# Patient Record
Sex: MALE | Race: WHITE
[De-identification: names, ages, dates, MRNs, and addresses within clinical notes are randomized per-mention and may not be internally consistent; named-entity substitution may affect disease eponyms.]

---

## 2019-09-27 NOTE — US
EXAMINATION TYPE: US abdomen limited

 

DATE OF EXAM: 9/27/2019

 

COMPARISON: NONE

 

CLINICAL HISTORY: R76.8 Other finding in Serum. Pt states testing +for Hep B, elevated LFT's, no othe
r complaints at this time

 

EXAM MEASUREMENTS:

 

Liver Length:  17.3 cm   

Gallbladder Wall:  0.2 cm   

CBD:  0.4 cm

Right Kidney:  10.9 x 4.3 x 4.5 cm

 

 

 

Pancreas:  wnl

Liver:  wnl  

Gallbladder:  wnl

**Evidence for sonographic Husain's sign:  No

CBD:  wnl 

Right Kidney:  wnl 

 

 

 

IMPRESSION: 

1. Hepatomegaly.

## 2020-02-15 NOTE — CT
EXAMINATION TYPE: CT soft tissue neck w con

 

DATE OF EXAM: 2/14/2020

 

COMPARISON: None

 

HISTORY: Right submandibular mass. BB placed on region of interest.

 

CT DLP: 494.2 mGycm

 

CONTRAST: 

CT scan of the neck is performed with IV Contrast, patient injected with 100ml mL of Isovue 300.

 

Contrast enhanced CT of the neck was performed from the skull base through the lung apices. Marker wa
s placed at the site of clinical concern which corresponds to the right submandibular region.

 

AIRWAY:   The supraglottic, glottic, and subglottic portions of the airway appear patent and free of 
mass.

 

SALIVARY GLANDS:  The submandibular and parotid glands are free of mass or inflammatory process.

 

THYROID GLAND:  No nodules or masses seen.

 

LYMPH NODES: At The site of clinical concern there is a 9.8 mm lymph node identified. There are small
er sub-8 mm lymph nodes identified within the internal jugular chains bilaterally as well as the post
erior triangles. No adenopathy greater than 1 cm appreciated. 

 

LUNG APICES:  No nodule or mass is seen. Upper lobe emphysematous changes noted.

 

OTHER:  Vascular structures are patent.  No significant degenerative change of the cervical spine.  N
o abscess seen.

 

IMPRESSION:

At The site of clinical concern there is a 9.8 mm lymph node identified. There are smaller sub-8 mm l
ymph nodes identified within the internal jugular chains bilaterally as well as the posterior triangl
es. No adenopathy greater than 1 cm appreciated.

## 2021-10-21 ENCOUNTER — HOSPITAL ENCOUNTER (OUTPATIENT)
Dept: HOSPITAL 47 - 3SCARD | Age: 46
Setting detail: OBSERVATION
LOS: 1 days | Discharge: HOME | End: 2021-10-22
Attending: FAMILY MEDICINE | Admitting: FAMILY MEDICINE
Payer: COMMERCIAL

## 2021-10-21 DIAGNOSIS — E66.9: ICD-10-CM

## 2021-10-21 DIAGNOSIS — Z82.49: ICD-10-CM

## 2021-10-21 DIAGNOSIS — Z79.82: ICD-10-CM

## 2021-10-21 DIAGNOSIS — I25.2: ICD-10-CM

## 2021-10-21 DIAGNOSIS — I10: ICD-10-CM

## 2021-10-21 DIAGNOSIS — Z79.02: ICD-10-CM

## 2021-10-21 DIAGNOSIS — E78.5: ICD-10-CM

## 2021-10-21 DIAGNOSIS — Z79.899: ICD-10-CM

## 2021-10-21 DIAGNOSIS — Z87.891: ICD-10-CM

## 2021-10-21 DIAGNOSIS — Z95.5: ICD-10-CM

## 2021-10-21 DIAGNOSIS — I25.110: Primary | ICD-10-CM

## 2021-10-21 PROCEDURE — 80061 LIPID PANEL: CPT

## 2021-10-21 PROCEDURE — 80048 BASIC METABOLIC PNL TOTAL CA: CPT

## 2021-10-21 NOTE — PTCA
PERCUTANEOUSTRANS CORORONARY ANGIOGRAPHY



Mr. Le is a 45-year-old male with a strong family history of coronary 
artery

disease and prior history of smoking, which he stopped 2 years ago, who 
presented with

symptoms of chest discomfort to [facility] and was found to have

evidence consistent with unstable angina.  He underwent cardiac catheterization 
that

revealed a critical stenosis involving the mid LAD.  He was transferred to 
[facility] to undergo further evaluation.  The procedure, its risks and

complications were discussed with the patient, who was in full understanding and

agreement.



PROCEDURE DESCRIPTION:

Patient was brought to the cath lab in a fasting, semi-sedated state after 
receiving

Versed and Benadryl and achieving a moderate conscious sedated state.  Using 
guidewire

exchange technique, the 6[place] sheath in the right radial artery was exchanged
for a

new 6-[place] sheath.  Following that, a 6-[place] FL3.5 bend guiding catheter was

introduced in the system. After cannulating the left main, a 0.014 balanced 
medium

weight J-wire was advanced across the lesion, positioned distally, and then a 
2.5 x 12

mm NC Trek balloon was advanced and one inflation at 8 atmospheres was done. 
Following

that, the balloon was removed and a 3.0 x 18 mm Xience Skypoint stent was 
advanced,

deployed and post-dilated at 16 atmospheres.  After the last inflation, after

appropriate wait, the balloon and the guidewire were withdrawn back into the 
guiding

catheter.  Images were obtained and repeated. Those images revealed stable 
successful

stenting.  At that point, the guiding catheter, the balloon and the guidewire 
were

removed.  The sheath was removed. Hemostasis was obtained with deployment of a 
TR band.

There was no immediate complication. The patient was returned to his room in 
stable

condition.  Of note, the patient received 5500 units of intravenous heparin. His
ACT

was followed.  He has received an oral loading dose of clopidogrel.  He had no 
chest

discomfort with the inflation, with mild EKG changes that resolved at the end of
the

procedure.



RESULT:

Successful stenting of the mid LAD with reduction of stenosis from 99% to 0%.



RECOMMENDATIONS:

Patient will be continued on aspirin, Plavix and statin. The importance of  dual

antiplatelet treatment was discussed with the patient and his family, who are in
full

understanding and agreement.



Duration of sedation was 23 minutes.





MMODL / IJN: [id number] / Job#: [id number]

[facility]

## 2021-10-22 VITALS — HEART RATE: 66 BPM | SYSTOLIC BLOOD PRESSURE: 111 MMHG | DIASTOLIC BLOOD PRESSURE: 69 MMHG | TEMPERATURE: 98.1 F

## 2021-10-22 VITALS — RESPIRATION RATE: 16 BRPM

## 2021-10-22 LAB
ANION GAP SERPL CALC-SCNC: 7 MMOL/L
BUN SERPL-SCNC: 18 MG/DL (ref 9–20)
CALCIUM SPEC-MCNC: 9.6 MG/DL (ref 8.4–10.2)
CHLORIDE SERPL-SCNC: 104 MMOL/L (ref 98–107)
CHOLEST SERPL-MCNC: 211 MG/DL (ref 0–200)
CO2 SERPL-SCNC: 27 MMOL/L (ref 22–30)
GLUCOSE SERPL-MCNC: 98 MG/DL (ref 74–99)
HDLC SERPL-MCNC: 43.7 MG/DL (ref 40–60)
LDLC SERPL CALC-MCNC: 141.5 MG/DL (ref 0–131)
POTASSIUM SERPL-SCNC: 4.4 MMOL/L (ref 3.5–5.1)
SODIUM SERPL-SCNC: 138 MMOL/L (ref 137–145)
TRIGL SERPL-MCNC: 129 MG/DL (ref 0–149)
VLDLC SERPL CALC-MCNC: 25.8 MG/DL (ref 5–40)

## 2021-10-22 NOTE — P.DS
Providers


Date of admission: 


10/21/21 11:17





Expected date of discharge: 10/22/21


Attending physician: 


Tamir Garcia





Consults: 





                                        





10/21/21 12:56


Consult Physician Routine 


   Consulting Provider: [facility]


   Consult Reason/Comments: Post Interventional patient


   Do you want consulting provider notified?: Already Contacted











Primary care physician: 


Stated None





Hospital Course: 





Chief Complaint: Chest pain





This is a pleasant 45-year-old patient who presented initially to [facility].  He developed pain across the chest when he woke up and it 

progressed in the daytime.  Pain also been on the left arm.  There is no 

dizziness or lightheadedness aspiration.  Patient had been feeling about weak 

for about a week.  Patient is admitted at the [facility] with unstable 

angina.  Troponin was negative.  Cardiac Showed and 90-95% lesion of the mid 

LAD.  Patient was transferred here.  Successful angioplasty stenting was carried

out.


This morning no chest pain or shortness breath.  Cleared by currently for 

discharge.





Consultation:


Dr. Rodarte from cardiology








Past medical history to include:


Unremarkable





Social history:


.  Supervisor at [employer].  Smoked a pack a day for 28 years stopped 2 months 

ago.  Alcohol occasionally.  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





Family history:


CAD





Physical examination:


VITAL SIGNS: 98.4, 65, 16, 116 x 74, 96% room air


GENERAL: BMI 31, sitting up, comfortable.


EYES: Pupils equal.  Conjunctiva normal.


HEENT: External appearance of nose and ears normal, oral cavity grossly normal.


NECK: JVD not raised; masses not palpable.


HEART: First and second heart sounds are normal;  no edema.  


LUNGS: Respiratory rate normal; clear to auscultation.  


ABDOMEN: Soft,  nontender, liver spleen not palpable, no masses palpable.  


PSYCH: Alert and oriented x3;  mood  and affect normal.  


NEUROLOGICAL: Cranial nerves grossly intact; no facial asymmetry,   power and 

sensation grossly intact. 


LYMPHATICS: No lymph nodes palpable in the axilla and neck





INVESTIGATIONS, reviewed in the clinical context:


From [facility]:


EKG tracing personally reviewed by me: Sinus rhythm.


White count 4.8 hemoglobin 15.7 platelets 189 creatinine 0.9 potassium 4.1 LDL 

159





Assessment and plan:





-Unstable angina





-Coronary artery disease with 90-95% lesion LAD


Successful angioplasty and stenting





-Obesity BMI 31


Weight loss measures





-Hyperlipidemia


Lipitor








Disposition:


Home








Plan - Discharge Summary


Discharge Rx Participant: Yes


New Discharge Prescriptions: 


New


   Aspirin 81 mg PO DAILY 30 Days #30 tab


   Clopidogrel [Plavix] 75 mg PO DAILY 30 Days #30 tab


   Atorvastatin [Lipitor] 80 mg PO HS 30 Days #30 tab


   Nitroglycerin Sl Tabs [Nitrostat] 0.4 mg SUBLINGUAL Q5M PRN #25 tab


     PRN Reason: Chest Pain





Changed


   Omeprazole Magnesium [PriLOSEC OTC] 20 mg PO HS PRN #30 tab


     PRN Reason: Heartburn


Discharge Medication List





Aspirin 81 mg PO DAILY 30 Days #30 tab 10/22/21 [Rx]


Atorvastatin [Lipitor] 80 mg PO HS 30 Days #30 tab 10/22/21 [Rx]


Clopidogrel [Plavix] 75 mg PO DAILY 30 Days #30 tab 10/22/21 [Rx]


Nitroglycerin Sl Tabs [Nitrostat] 0.4 mg SUBLINGUAL Q5M PRN #25 tab 10/22/21 

[Rx]


Omeprazole Magnesium [PriLOSEC OTC] 20 mg PO HS PRN #30 tab 10/22/21 [Rx]








Follow up Appointment(s)/Referral(s): 


Andrews Nugent MD [REFERRING] - 1 Week


Kaleb Oquendo MD [STAFF PHYSICIAN] - 10/28/21 4:00 pm


Patient Instructions/Handouts:  Left Heart Catheterization (DC)


Discharge Disposition: HOME SELF-CARE

## 2021-10-22 NOTE — P.HPIM
History of Present Illness


H&P Date: 10/22/21


Chief Complaint: Chest pain





This is a pleasant 45-year-old patient who presented initially to [facility].  He developed pain across the chest when he woke up and it 

progressed in the daytime.  Pain also been on the left arm.  There is no 

dizziness or lightheadedness aspiration.  Patient had been feeling about weak 

for about a week.  Patient is admitted at the [facility] with unstable 

angina.  Troponin was negative.  Cardiac Showed and 90-95% lesion of the mid 

LAD.  Patient was transferred here.  Successful angioplasty stenting was carried

out.


This morning no chest pain or shortness breath.





Review of systems:


GEN.:  Tired


EYES: None


HEENT: None


NECK: None


RESPIRATORY: None


CARDIOVASCULAR: As above


GASTROINTESTINAL: Heartburn


GENITOURINARY: None


MUSCULOSKELETAL: None


LYMPHATICS: None


HEMATOLOGICAL: None  


PSYCHIATRY: None


NEUROLOGICAL: None





Past medical history to include:


Unremarkable





Social history:


.  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 

ago.  Alcohol occasionally.  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





Family history:


CAD





Physical examination:


VITAL SIGNS: 98.4, 65, 16, 116 x 74, 96% room air


GENERAL: BMI 31, sitting up, comfortable.


EYES: Pupils equal.  Conjunctiva normal.


HEENT: External appearance of nose and ears normal, oral cavity grossly normal.


NECK: JVD not raised; masses not palpable.


HEART: First and second heart sounds are normal;  no edema.  


LUNGS: Respiratory rate normal; clear to auscultation.  


ABDOMEN: Soft,  nontender, liver spleen not palpable, no masses palpable.  


PSYCH: Alert and oriented x3;  mood  and affect normal.  


NEUROLOGICAL: Cranial nerves grossly intact; no facial asymmetry,   power and 

sensation grossly intact. 


LYMPHATICS: No lymph nodes palpable in the axilla and neck





INVESTIGATIONS, reviewed in the clinical context:


From [facility]:


EKG tracing personally reviewed by me: Sinus rhythm.


White count 4.8 hemoglobin 15.7 platelets 189 creatinine 0.9 potassium 4.1 LDL 

159





Assessment and plan:





-Unstable angina





-Coronary artery disease with 90-95% lesion LAD


Successful angioplasty and stenting





-Obesity BMI 31


Weight loss measures





-Hyperlipidemia


Lipitor





Patient on Plavix, aspirin, Lipitor, being followed by cardiology.  Care was 

discussed with the patient.





Past Medical History


Past Medical History: Myocardial Infarction (MI)


Last Myocardial Infarction Date:: 10/21/21


History of Any Multi-Drug Resistant Organisms: None Reported


Past Surgical History: Heart Catheterization With Stent


Past Anesthesia/Blood Transfusion Reactions: No Reported Reaction


Date of Last Stent Placement:: 10/21/21


Smoking Status: Former smoker





- Past Family History


  ** Father


Family Medical History: Myocardial Infarction (MI)





Medications and Allergies


                                Home Medications











 Medication  Instructions  Recorded  Confirmed  Type


 


Aspirin 81 mg PO DAILY 30 Days #30 tab 10/22/21  Rx


 


Atorvastatin [Lipitor] 80 mg PO HS 30 Days #30 tab 10/22/21  Rx


 


Clopidogrel [Plavix] 75 mg PO DAILY 30 Days #30 tab 10/22/21  Rx


 


Nitroglycerin Sl Tabs [Nitrostat] 0.4 mg SUBLINGUAL Q5M PRN #25 tab 10/22/21  Rx


 


Omeprazole Magnesium [PriLOSEC OTC] 20 mg PO HS PRN #30 tab 10/22/21  Rx








                                    Allergies











Allergy/AdvReac Type Severity Reaction Status Date / Time


 


No Known Allergies Allergy   Verified 10/21/21 16:16














Physical Exam


Vitals: 


                                   Vital Signs











  Temp Pulse Resp BP Pulse Ox


 


 10/22/21 08:00  98.1 F  66  16  111/69  97


 


 10/22/21 03:00  98.4 F  65  16  116/74  96


 


 10/21/21 23:45  98.4 F  65  14  113/75  95


 


 10/21/21 20:00  98.6 F  66  18  126/86  96


 


 10/21/21 16:41   73  20  123/88  95


 


 10/21/21 15:58  98.5 F  65  20  127/85  96


 


 10/21/21 14:33   72  16  122/87  95


 


 10/21/21 14:03   74  16  115/76  94 L


 


 10/21/21 13:33   74  16  120/79  95


 


 10/21/21 13:12   70  16  137/76 


 


 10/21/21 13:03   85  16  170/103 








                                Intake and Output











 10/21/21 10/22/21 10/22/21





 22:59 06:59 14:59


 


Intake Total 270  


 


Balance 270  


 


Intake:   


 


  Intake, IV Titration 150  





  Amount   


 


    Sodium Chloride 0.9% 1, 150  





    000 ml @ 75 mls/hr IV .   





    [id number] SANTIAGO Rx#:[id number]   


 


  Oral 120  


 


Other:   


 


  # Voids 1  


 


  Weight 90 kg 89.9 kg 














Results


CBC & Chem 7: 


                                 10/22/21 06:16





Thrombosis Risk Factor Assmnt





- Choose All That Apply


Each Factor Represents 1 point: Acute MI, Age 41-60 years


Thrombosis Risk Factor Assessment Total Risk Factor Score: 2


Thrombosis Risk Factor Assessment Level: Low Risk

## 2021-10-22 NOTE — P.PN
Subjective


Progress Note Date: 10/22/21


Principal diagnosis: 





Coronary artery disease





The patient is a pleasant 45-year-old gentleman who presented to [facility] with a chest discomfort consistent was unstable angina.  He 

underwent heart catheterization by Dr. Oquendo and was found to have 

critical disease involving the mid LAD.  Subsequent to the patient was 

transferred to [facility] where he underwent stenting of the LAD.





He was seen this morning.  His chest pain-free.  The right radial access is soft

and nontender with a good pulse.  From the cardiovascular standpoint of view, 

the patient can be discharged home.





Objective





- Vital Signs


Vital signs: 


                                   Vital Signs











Temp  98.1 F   10/22/21 08:00


 


Pulse  66   10/22/21 08:00


 


Resp  16   10/22/21 08:00


 


BP  111/69   10/22/21 08:00


 


Pulse Ox  97   10/22/21 08:00








                                 Intake & Output











 10/21/21 10/22/21 10/22/21





 18:59 06:59 18:59


 


Intake Total 320  


 


Balance 320  


 


Weight 90 kg 89.9 kg 


 


Intake:   


 


  IV 50  


 


  Intake, IV Titration 150  





  Amount   


 


    Sodium Chloride 0.9% 1, 150  





    000 ml @ 75 mls/hr IV .   





    [id number] SANTIAGO Rx#:[id number]   


 


  Oral 120  


 


Other:   


 


  # Voids 1  














- Constitutional


General appearance: Present: no acute distress





- Respiratory


Respiratory: bilateral: CTA





- Cardiovascular


Rhythm: regular


Heart sounds: normal: S1, S2





- Labs


CBC & Chem 7: 


                                 10/22/21 06:16


Labs: 


                  Abnormal Lab Results - Last 24 Hours (Table)











  10/22/21 Range/Units





  06:16 


 


Cholesterol  211.00 H  (0..00)  mg/dL


 


LDL Cholesterol, Calc  141.5 H  (0.0-131.0)  mg/dL














Assessment and Plan


Assessment: 





Assessment


#1 coronary artery disease and status post PCI of the LAD


#2 hypertension


#3 dyslipidemia





Plan


#1 the patient can be discharged home


#2 continue dual antiplatelet therapy and statin


#3 follow-up with the patient

## 2022-09-28 ENCOUNTER — HOSPITAL ENCOUNTER (OUTPATIENT)
Dept: HOSPITAL 47 - PROCWHC3 | Age: 47
End: 2022-09-28
Attending: PHYSICIAN ASSISTANT
Payer: COMMERCIAL

## 2022-09-28 VITALS — SYSTOLIC BLOOD PRESSURE: 103 MMHG | HEART RATE: 59 BPM | DIASTOLIC BLOOD PRESSURE: 69 MMHG | RESPIRATION RATE: 16 BRPM

## 2022-09-28 VITALS — TEMPERATURE: 98.4 F

## 2022-09-28 DIAGNOSIS — U07.1: Primary | ICD-10-CM

## 2022-09-28 DIAGNOSIS — E66.9: ICD-10-CM
